# Patient Record
Sex: MALE | Race: WHITE | Employment: FULL TIME | URBAN - METROPOLITAN AREA
[De-identification: names, ages, dates, MRNs, and addresses within clinical notes are randomized per-mention and may not be internally consistent; named-entity substitution may affect disease eponyms.]

---

## 2020-11-10 PROBLEM — R42 DIZZINESS: Status: ACTIVE | Noted: 2020-11-10

## 2020-12-16 PROBLEM — I10 ESSENTIAL HYPERTENSION: Status: ACTIVE | Noted: 2020-12-16

## 2020-12-16 PROBLEM — G43.909 MIGRAINES: Status: ACTIVE | Noted: 2020-12-16

## 2024-01-31 ENCOUNTER — ANESTHESIA EVENT (OUTPATIENT)
Dept: SURGERY | Facility: HOSPITAL | Age: 54
End: 2024-01-31
Payer: MEDICAID

## 2024-01-31 ENCOUNTER — HOSPITAL ENCOUNTER (OUTPATIENT)
Facility: HOSPITAL | Age: 54
Setting detail: HOSPITAL OUTPATIENT SURGERY
Discharge: HOME | End: 2024-01-31
Attending: OTOLARYNGOLOGY | Admitting: OTOLARYNGOLOGY
Payer: MEDICAID

## 2024-01-31 ENCOUNTER — ANESTHESIA (OUTPATIENT)
Dept: SURGERY | Facility: HOSPITAL | Age: 54
End: 2024-01-31
Payer: MEDICAID

## 2024-01-31 VITALS
TEMPERATURE: 97 F | SYSTOLIC BLOOD PRESSURE: 149 MMHG | HEIGHT: 67 IN | RESPIRATION RATE: 18 BRPM | DIASTOLIC BLOOD PRESSURE: 98 MMHG | OXYGEN SATURATION: 96 % | HEART RATE: 89 BPM | BODY MASS INDEX: 28.72 KG/M2 | WEIGHT: 183 LBS

## 2024-01-31 DIAGNOSIS — K13.70 LESION OF ORAL MUCOSA: ICD-10-CM

## 2024-01-31 DIAGNOSIS — Z01.818 PRE-OP TESTING: Primary | ICD-10-CM

## 2024-01-31 PROCEDURE — 88307 TISSUE EXAM BY PATHOLOGIST: CPT | Performed by: OTOLARYNGOLOGY

## 2024-01-31 PROCEDURE — 0WB3XZZ EXCISION OF ORAL CAVITY AND THROAT, EXTERNAL APPROACH: ICD-10-PCS | Performed by: OTOLARYNGOLOGY

## 2024-01-31 RX ORDER — ONDANSETRON 2 MG/ML
4 INJECTION INTRAMUSCULAR; INTRAVENOUS EVERY 6 HOURS PRN
Status: DISCONTINUED | OUTPATIENT
Start: 2024-01-31 | End: 2024-01-31

## 2024-01-31 RX ORDER — GLYCOPYRROLATE 0.2 MG/ML
INJECTION, SOLUTION INTRAMUSCULAR; INTRAVENOUS AS NEEDED
Status: DISCONTINUED | OUTPATIENT
Start: 2024-01-31 | End: 2024-01-31 | Stop reason: SURG

## 2024-01-31 RX ORDER — HYDROMORPHONE HYDROCHLORIDE 1 MG/ML
INJECTION, SOLUTION INTRAMUSCULAR; INTRAVENOUS; SUBCUTANEOUS
Status: COMPLETED
Start: 2024-01-31 | End: 2024-01-31

## 2024-01-31 RX ORDER — PROCHLORPERAZINE EDISYLATE 5 MG/ML
5 INJECTION INTRAMUSCULAR; INTRAVENOUS EVERY 8 HOURS PRN
Status: DISCONTINUED | OUTPATIENT
Start: 2024-01-31 | End: 2024-01-31

## 2024-01-31 RX ORDER — HYDROMORPHONE HYDROCHLORIDE 1 MG/ML
0.4 INJECTION, SOLUTION INTRAMUSCULAR; INTRAVENOUS; SUBCUTANEOUS EVERY 5 MIN PRN
Status: DISCONTINUED | OUTPATIENT
Start: 2024-01-31 | End: 2024-01-31

## 2024-01-31 RX ORDER — HYDROCODONE BITARTRATE AND ACETAMINOPHEN 5; 325 MG/1; MG/1
1 TABLET ORAL ONCE AS NEEDED
Status: COMPLETED | OUTPATIENT
Start: 2024-01-31 | End: 2024-01-31

## 2024-01-31 RX ORDER — HYDROMORPHONE HYDROCHLORIDE 1 MG/ML
0.6 INJECTION, SOLUTION INTRAMUSCULAR; INTRAVENOUS; SUBCUTANEOUS EVERY 5 MIN PRN
Status: DISCONTINUED | OUTPATIENT
Start: 2024-01-31 | End: 2024-01-31

## 2024-01-31 RX ORDER — HYDROCODONE BITARTRATE AND ACETAMINOPHEN 5; 325 MG/1; MG/1
1-2 TABLET ORAL EVERY 6 HOURS PRN
Qty: 30 TABLET | Refills: 0 | Status: SHIPPED | OUTPATIENT
Start: 2024-01-31

## 2024-01-31 RX ORDER — MIDAZOLAM HYDROCHLORIDE 1 MG/ML
1 INJECTION INTRAMUSCULAR; INTRAVENOUS EVERY 5 MIN PRN
Status: DISCONTINUED | OUTPATIENT
Start: 2024-01-31 | End: 2024-01-31

## 2024-01-31 RX ORDER — CEFAZOLIN SODIUM/WATER 2 G/20 ML
2 SYRINGE (ML) INTRAVENOUS ONCE
Status: COMPLETED | OUTPATIENT
Start: 2024-01-31 | End: 2024-01-31

## 2024-01-31 RX ORDER — METOCLOPRAMIDE HYDROCHLORIDE 5 MG/ML
INJECTION INTRAMUSCULAR; INTRAVENOUS AS NEEDED
Status: DISCONTINUED | OUTPATIENT
Start: 2024-01-31 | End: 2024-01-31 | Stop reason: SURG

## 2024-01-31 RX ORDER — SCOLOPAMINE TRANSDERMAL SYSTEM 1 MG/1
1 PATCH, EXTENDED RELEASE TRANSDERMAL ONCE
Status: DISCONTINUED | OUTPATIENT
Start: 2024-01-31 | End: 2024-01-31 | Stop reason: HOSPADM

## 2024-01-31 RX ORDER — HYDROCODONE BITARTRATE AND ACETAMINOPHEN 5; 325 MG/1; MG/1
2 TABLET ORAL ONCE AS NEEDED
Status: COMPLETED | OUTPATIENT
Start: 2024-01-31 | End: 2024-01-31

## 2024-01-31 RX ORDER — LIDOCAINE HYDROCHLORIDE AND EPINEPHRINE 10; 10 MG/ML; UG/ML
INJECTION, SOLUTION INFILTRATION; PERINEURAL AS NEEDED
Status: DISCONTINUED | OUTPATIENT
Start: 2024-01-31 | End: 2024-01-31 | Stop reason: HOSPADM

## 2024-01-31 RX ORDER — SODIUM CHLORIDE, SODIUM LACTATE, POTASSIUM CHLORIDE, CALCIUM CHLORIDE 600; 310; 30; 20 MG/100ML; MG/100ML; MG/100ML; MG/100ML
INJECTION, SOLUTION INTRAVENOUS CONTINUOUS
Status: DISCONTINUED | OUTPATIENT
Start: 2024-01-31 | End: 2024-01-31

## 2024-01-31 RX ORDER — NALOXONE HYDROCHLORIDE 0.4 MG/ML
0.08 INJECTION, SOLUTION INTRAMUSCULAR; INTRAVENOUS; SUBCUTANEOUS AS NEEDED
Status: DISCONTINUED | OUTPATIENT
Start: 2024-01-31 | End: 2024-01-31

## 2024-01-31 RX ORDER — CHLORHEXIDINE GLUCONATE ORAL RINSE 1.2 MG/ML
SOLUTION DENTAL
Qty: 473 ML | Refills: 0 | Status: SHIPPED | OUTPATIENT
Start: 2024-01-31

## 2024-01-31 RX ORDER — ACETAMINOPHEN 500 MG
1000 TABLET ORAL ONCE AS NEEDED
Status: COMPLETED | OUTPATIENT
Start: 2024-01-31 | End: 2024-01-31

## 2024-01-31 RX ORDER — ACETAMINOPHEN 500 MG
1000 TABLET ORAL ONCE
Status: DISCONTINUED | OUTPATIENT
Start: 2024-01-31 | End: 2024-01-31 | Stop reason: HOSPADM

## 2024-01-31 RX ORDER — HYDROMORPHONE HYDROCHLORIDE 1 MG/ML
0.2 INJECTION, SOLUTION INTRAMUSCULAR; INTRAVENOUS; SUBCUTANEOUS EVERY 5 MIN PRN
Status: DISCONTINUED | OUTPATIENT
Start: 2024-01-31 | End: 2024-01-31

## 2024-01-31 RX ORDER — DEXAMETHASONE SODIUM PHOSPHATE 4 MG/ML
VIAL (ML) INJECTION AS NEEDED
Status: DISCONTINUED | OUTPATIENT
Start: 2024-01-31 | End: 2024-01-31 | Stop reason: SURG

## 2024-01-31 RX ORDER — MEPERIDINE HYDROCHLORIDE 25 MG/ML
25 INJECTION INTRAMUSCULAR; INTRAVENOUS; SUBCUTANEOUS
Status: DISCONTINUED | OUTPATIENT
Start: 2024-01-31 | End: 2024-01-31

## 2024-01-31 RX ORDER — ONDANSETRON 2 MG/ML
INJECTION INTRAMUSCULAR; INTRAVENOUS
Status: COMPLETED
Start: 2024-01-31 | End: 2024-01-31

## 2024-01-31 RX ADMIN — METOCLOPRAMIDE HYDROCHLORIDE 10 MG: 5 INJECTION INTRAMUSCULAR; INTRAVENOUS at 11:46:00

## 2024-01-31 RX ADMIN — GLYCOPYRROLATE 0.4 MG: 0.2 INJECTION, SOLUTION INTRAMUSCULAR; INTRAVENOUS at 11:00:00

## 2024-01-31 RX ADMIN — CEFAZOLIN SODIUM/WATER 2 G: 2 G/20 ML SYRINGE (ML) INTRAVENOUS at 10:30:00

## 2024-01-31 RX ADMIN — DEXAMETHASONE SODIUM PHOSPHATE 4 MG: 4 MG/ML VIAL (ML) INJECTION at 11:46:00

## 2024-01-31 NOTE — ANESTHESIA PROCEDURE NOTES
Airway  Date/Time: 1/31/2024 10:31 AM  Urgency: elective    Airway not difficult    General Information and Staff    Patient location during procedure: OR  Anesthesiologist: Hector Arguello MD  Performed: anesthesiologist   Performed by: Hector Arguello MD  Authorized by: Hector Arguello MD      Indications and Patient Condition  Indications for airway management: anesthesia  Spontaneous Ventilation: absent  Sedation level: deep  Preoxygenated: yes  Patient position: sniffing  Mask difficulty assessment: 1 - vent by mask    Final Airway Details  Final airway type: endotracheal airway      Successful airway: ETT  Cuffed: yes   Successful intubation technique: direct laryngoscopy  Endotracheal tube insertion site: oral  Blade: Scott  Blade size: #3  ETT size (mm): 7.0    Cormack-Lehane Classification: grade IIA - partial view of glottis  Placement verified by: capnometry   Measured from: teeth  ETT to teeth (cm): 22  Number of attempts at approach: 1  Ventilation between attempts: none

## 2024-01-31 NOTE — BRIEF OP NOTE
Pre-Operative Diagnosis: ORAL LEUKOPLAKIA     Post-Operative Diagnosis: ORAL LEUKOPLAKIA      Procedure Performed:   EXCISION LESION OF ORAL CAVITY LESION WITH REPAIR    Surgeon(s) and Role:     * Mario Marrufo MD - Primary    Assistant(s):   none     Surgical Findings: irregular mucosal lesion of right buccal surface     Specimen: right buccal surface lesion     Estimated Blood Loss: Blood Output: 5 mL (1/31/2024 11:43 AM)    Mario Marrufo MD  1/31/2024  12:00 PM

## 2024-01-31 NOTE — DISCHARGE INSTRUCTIONS
[address]   Phone [phone]  Mouth Surgery Instructions  -  Dr. Mario Marrufo      WHAT TO EXPECT AFTER SURGERY  Lip swelling and soreness of the mouth/lip are normal    ACTIVITY   No vigorous physical activity for 1 week after surgery (no running or playing hard at recess or PE class)    DIET  Soft foods and cool drinks are best.  Avoid acidic, spicy, crunchy, or sharp foods (especially nuts, popcorn, chips, pretzels and pizza crust)    MEDICATIONS  You may use Tylenol® (acetaminophen) or ibuprofen as needed for pain (dose according to weight and/or age, as indicated on the product packaging).  Rinse the mouth with prescription mouthwash (chlorhexidine) after meals and at bed time    FOLLOW-UP  Make an appointment to see your surgeon 1 week after surgery.

## 2024-01-31 NOTE — H&P
CHIEF COMPLAINT: Patient presents with:  Pre-Op Exam: When-1/31/24  Where- [facility]  Done by- Mario Marrufo MD  Procedure- Execision Lesion of oral cavity with repair    Samuel Miranda is a 53 year old male who presents for pre-op risk stratification.  Patient is planning to have Excision Lesion of Oral Cavithy with Repair with Dr. Marrufo at [facility] on 1/31/2024.  Patient needs labs/EKG/h&p and was advised to hold ASA/NSAIDs for 7 days.    Patient denies:  Chest pain or discomfort / Jaw pain or discomfort / Arm pain or discomfort / SOB / MONTGOMERY / Paroxysmal nocturnal dyspnea / Orthopnea / Palpations / Edema    Previous hx of cardiac or vascular issues include:  none    Patient has a 0 pack year history of smoking.    REVIEW OF SYSTEMS:  SYSTEMIC: No fatigue. No unusual changes in weight. No fever or chills.  HEAD: No headache.  EYES: No changes in vision.  ENT: Denies: Sore throat.  NECK: No pain with ROM.  CENTRAL NERVOUS SYSTEM: Denies: Weakness.  RESPIRATORY: Denies: Cough, sputum production, hemoptysis, wheezing or dyspnea.  CARDIOVASCULAR: No chest pain, no palpitations. Denies SOB  GASTROINTESTINAL: Denies N/V/D/C, Denies melena, hematochezia  GENITOURINARY: No dysuria, hematuria, frequency, urgency, or incontinence.  MUSCULOSKELETAL/JOINTS: No pain, swelling or stiffness. No decreased ROM  SKIN: No rashes or lesions.  HEME: Denies easy bruising or bleeding.  PSYCHIATRIC: Denies: depression, anxiety, changes in sleep patterns    PAST MEDICAL HISTORY:  Past Medical History:  Diagnosis Date  Essential hypertension    SOCIAL HISTORY:  Social History  Tobacco Use  Smoking status: Never  Smokeless tobacco: Never  Vaping Use  Vaping Use: Never used  Alcohol use: Not Currently  Alcohol/week: 1.0 standard drink of alcohol  Types: 1 Cans of beer per week  Comment: rare  Drug use: Never    MEDICATIONS:  Current Outpatient Medications  Medication Sig Dispense Refill  naproxen 500 MG Oral Tab Take 500  mg by mouth 2 (two) times daily with meals.  amLODIPine 5 MG Oral Tab Take 1 tablet (5 mg total) by mouth daily. 90 tablet 1  SUMAtriptan 25 MG Oral Tab Take 1 tablet (25 mg total) by mouth as needed for Migraine. 90 tablet 1    ALLERGIES:    Adhesive Tape ITCHING    PROBLEM LIST:  Patient Active Problem List:  Dizziness  Essential hypertension  Migraines  Paresthesias  Benign positional paroxysmal vertigo involving lateral canal  Vitamin B12 deficiency  Hashimoto's disease  SOB (shortness of breath) on exertion  Hyperlipidemia, unspecified hyperlipidemia type  Calculus of gallbladder with chronic cholecystitis without obstruction    PHYSICAL EXAM:  VITALS: /77 (BP Location: Left arm, Patient Position: Sitting, Cuff Size: adult)  Pulse 65  Temp 97.4 °F (36.3 °C) (Oral)  Resp 16  Ht 5' 7\" (1.702 m)  Wt 185 lb 3.2 oz (84 kg)  SpO2 100%  BMI 29.01 kg/m²  GENERAL: Alert &Oriented x 3 / No Apparent Distress  HEENT: Normocephalic Atraumatic / CARLY / EOMI / B/L ears unremarkable  ORAL CAVITY: Moist Mucus Membranes  PHARYNX: No Erythema or Exudates  LYMPH NODES: No significant Lymphadenopathy  SKIN: Warm and dry  NECK: Supple, non-tender. JVP not elevated. No carotid bruits. No goiter.  BACK: No kyphosis. No scoliosis.  LUNGS: Clear to Auscultation Bilaterally without Rhonchi, Wheezes, Rales  HEART: Regular Rate Rhythm / No murmur appreciated  ABDOMEN: Soft, Non-tender, Non-distended. Bowel sounds are normal. No masses or organomegaly. No guarding or rebound tenderness.  EXTREMITIES: No clubbing, cyanosis or edema / Peripheral pulses are intact.  NEUROLOGIC: Grossly Normal. No focal deficits.    ASSESSMENT/PLAN:    Samuel was seen today for pre-op exam.    Diagnoses and all orders for this visit:    Pre-op examination  - ELECTROCARDIOGRAM, COMPLETE  - BASIC METABOLIC PANEL; Future  - CBC WITH DIFFERENTIAL WITH PLATELET; Future  Oral lesion  - Patient is planning to have Excision Lesion of Oral Cavithy with  Repair with Dr. Marrufo at [facility] on 1/31/2024.  Patient needs labs/EKG/h&p and was advised to hold ASA/NSAIDs for 7 days.  - Pre-Op labs were reviewed by myself and demonstrated: ACCEPTABLE  - EKG was reviewed by myself and demonstrated: NSR w/o ST changes  - Patient should follow up with Dr. Marrufo for Post-Op management and with PCP as recommended    Surgical Risk Stratification was performed in office today.  PATIENT IS A(N) ACCEPTABLE RISK FOR PLANNED PROCEDURE.  MEDICAL CLEARANCE HAS BEEN GRANTED.    Essential hypertension  - Stable  - Continue present management  - Will monitor    Migraine without status migrainosus, not intractable, unspecified migraine type  - Stable  - Continue present management  - Will monitor    Hyperlipidemia, unspecified hyperlipidemia type  - LIPID PANEL; Future  - Stable  - Continue present management  - Will monitor    Hashimoto's disease  - TSH W REFLEX TO FREE T4; Future  - Stable  - Continue present management  - Will monitor    This consult was sent back to the referring physician, Dr. Marrufo.    Patient was seen and examined by myself. I reviewed the past medical, family, and social history sections including the medications and allergies listed in the above medical record.    All questions that came up during the course of this visit were answered to the patient's verbal satisfaction. Patient understands their follow up plan and was provided with an After Visit Summary upon discharge.    The patient is advised to call the office if the condition worsens or new symptoms develop. If worsening symptoms develop and the clinic is not open, the patient is advised to go to the nearest urgent care or emergency department for evaluation.    Tay Escobar DO  01/17/24  12:39 PM

## 2024-01-31 NOTE — ANESTHESIA PREPROCEDURE EVALUATION
PRE-OP EVALUATION    Patient Name: Samuel Miranda    Admit Diagnosis: ORAL LEUKOPLAKIA    Pre-op Diagnosis: ORAL LEUKOPLAKIA    EXCISION LESION OF ORAL CAVITY WITH REPAIR BILATERAL    Anesthesia Procedure: EXCISION LESION OF ORAL CAVITY WITH REPAIR BILATERAL (Mouth)    Surgeon(s) and Role:     * Mario Marrufo MD - Primary    Pre-op vitals reviewed.  Temp: 97.2 °F (36.2 °C)  Pulse: 79  Resp: 18  BP: 149/93  SpO2: 95 %  Body mass index is 28.66 kg/m².    Current medications reviewed.  Hospital Medications:   lactated ringers infusion   Intravenous Continuous    [COMPLETED] ceFAZolin (Ancef) 2 g in 20mL IV syringe premix  2 g Intravenous Once    lactated ringers infusion   Intravenous Continuous    lactated ringers IV bolus 500 mL  500 mL Intravenous Once PRN    atropine 0.1 MG/ML injection 0.5 mg  0.5 mg Intravenous PRN    naloxone (Narcan) 0.4 MG/ML injection 0.08 mg  0.08 mg Intravenous PRN    fentaNYL (Sublimaze) 50 mcg/mL injection 25 mcg  25 mcg Intravenous Q5 Min PRN    Or    fentaNYL (Sublimaze) 50 mcg/mL injection 50 mcg  50 mcg Intravenous Q5 Min PRN    HYDROmorphone (Dilaudid) 1 MG/ML injection 0.2 mg  0.2 mg Intravenous Q5 Min PRN    Or    HYDROmorphone (Dilaudid) 1 MG/ML injection 0.4 mg  0.4 mg Intravenous Q5 Min PRN    Or    HYDROmorphone (Dilaudid) 1 MG/ML injection 0.6 mg  0.6 mg Intravenous Q5 Min PRN    [COMPLETED] acetaminophen (Tylenol Extra Strength) tab 1,000 mg  1,000 mg Oral Once PRN    Or    [COMPLETED] HYDROcodone-acetaminophen (Norco) 5-325 MG per tab 1 tablet  1 tablet Oral Once PRN    Or    [COMPLETED] HYDROcodone-acetaminophen (Norco) 5-325 MG per tab 2 tablet  2 tablet Oral Once PRN    ondansetron (Zofran) 4 MG/2ML injection 4 mg  4 mg Intravenous Q6H PRN    prochlorperazine (Compazine) 10 MG/2ML injection 5 mg  5 mg Intravenous Q8H PRN    midazolam (Versed) 2 MG/2ML injection 1 mg  1 mg Intravenous Q5 Min PRN    meperidine (Demerol) 25 MG/ML injection 25 mg  25 mg Intravenous Q1H  PRN    [COMPLETED] HYDROmorphone (Dilaudid) 1 MG/ML injection        [COMPLETED] ondansetron (Zofran) 4 MG/2ML injection           Outpatient Medications:     Medications Prior to Admission   Medication Sig Dispense Refill Last Dose    amLODIPine 5 MG Oral Tab Take 1 tablet (5 mg total) by mouth daily. 90 tablet 1 1/31/2024 at 0410    SUMAtriptan 25 MG Oral Tab Take 1 tablet (25 mg total) by mouth as needed for Migraine. 90 tablet 0 Past Week    famotidine 20 MG Oral Tab Take 1 tablet (20 mg total) by mouth 2 (two) times daily. 90 tablet 1 Unknown       Allergies: Adhesive tape      Anesthesia Evaluation    Patient summary reviewed.    Anesthetic Complications  (-) history of anesthetic complications         GI/Hepatic/Renal    Negative GI/hepatic/renal ROS.                             Cardiovascular        Exercise tolerance: good           (+) hypertension                                     Endo/Other    Negative endo/other ROS.                              Pulmonary                    (+) sleep apnea and CPAP      Neuro/Psych    Negative neuro/psych ROS.                                  Past Surgical History:   Procedure Laterality Date    CHOLECYSTECTOMY      OTHER SURGICAL HISTORY      \"Small cyst removed from jawline\"     Social History     Socioeconomic History    Marital status:    Tobacco Use    Smoking status: Never    Smokeless tobacco: Never   Vaping Use    Vaping Use: Never used   Substance and Sexual Activity    Alcohol use: Not Currently     Alcohol/week: 1.0 standard drink of alcohol     Types: 1 Cans of beer per week     Comment: rare    Drug use: Never     History   Drug Use Unknown     Available pre-op labs reviewed.               Airway      Mallampati: II  Mouth opening: 3 FB  TM distance: 4 - 6 cm  Neck ROM: limited Cardiovascular    Cardiovascular exam normal.         Dental    Dentition appears grossly intact         Pulmonary    Pulmonary exam normal.                 Other findings               ASA: 2   Plan: general  NPO status verified and patient meets guidelines.    Post-procedure pain management plan discussed with surgeon and patient.      Plan/risks discussed with: patient and spouse                Present on Admission:   Lesion of oral mucosa

## 2024-01-31 NOTE — ANESTHESIA POSTPROCEDURE EVALUATION
[facility]:  [facility]   Age/Gender 54 year old male MRN [id number]   Location [facility] PERIOPERATIVE SERVICE Attending Mario Marrufo MD   Hosp Day # 0 PCP Tay Escobar DO       Anesthesia Post-op Note    EXCISION LESION OF ORAL CAVITY WITH REPAIR BILATERAL    Procedure Summary       Date: 01/31/24 Room / Location:  MAIN OR 10 / [facility] MAIN OR    Anesthesia Start: 1028 Anesthesia Stop: 1151    Procedure: EXCISION LESION OF ORAL CAVITY WITH REPAIR BILATERAL (Mouth) Diagnosis: (ORAL LEUKOPLAKIA)    Surgeons: Mario Marrufo MD Anesthesiologist: Hector Arguello MD    Anesthesia Type: general ASA Status: 2            Anesthesia Type: general    Vitals Value Taken Time   /97 01/31/24 1359   Temp 97.2 °F (36.2 °C) 01/31/24 1257   Pulse 88 01/31/24 1413   Resp 18 01/31/24 1345   SpO2 94 % 01/31/24 1413   Vitals shown include unfiled device data.    Patient Location: PACU    Anesthesia Type: general    Airway Patency: extubated    Postop Pain Control: adequate    Mental Status: preanesthetic baseline    Nausea/Vomiting: none    Cardiopulmonary/Hydration status: stable euvolemic    Complications: no apparent anesthesia related complications    Postop vital signs: stable    Dental Exam: Unchanged from Preop    Patient to be discharged home when criteria met.

## 2024-01-31 NOTE — INTERVAL H&P NOTE
Pre-op Diagnosis: ORAL LEUKOPLAKIA    The above referenced H&P was reviewed by Mario Marrufo MD on 1/31/2024, the patient was examined and no significant changes have occurred in the patient's condition since the H&P was performed.  I discussed with the patient and/or legal representative the potential benefits, risks and side effects of this procedure; the likelihood of the patient achieving goals; and potential problems that might occur during recuperation.  I discussed reasonable alternatives to the procedure, including risks, benefits and side effects related to the alternatives and risks related to not receiving this procedure.  We will proceed with procedure as planned.

## 2024-01-31 NOTE — OPERATIVE REPORT
PATIENT NAME: Samuel Miranda   YOB: 1970  MRN: [id number]   DATE OF OPERATION: 1/31/2024   LOCATION OF OPERATION: [facility]     PREOPERATIVE DIAGNOSIS:    1.  Oral cavity lesion of the right buccal surface    POSTOPERATIVE DIAGNOSIS:   1. Same     PROCEDURE:    1.  Excision of oral cavity lesion of right buccal surface (2 x 3 cm)    ANESTHESIA: General endotracheal anesthesia  SURGEON: Mario Marrufo MD, FACS  ASSISTANT: None  EBL: 5 mL  IVF: 1500 mL  UOP: None  DRAINS: None  IMPLANTS: None  SPECIMENS: Right buccal surface lesion  FINDINGS: Irregular approximately 2 x 3 cm mucosal lesion of the right buccal surface abutting the junction of wet and dry lip at the oral commissure    INDICATIONS & HISTORY:   Samuel Miranda is a 54 year old male with a history of a painless right buccal surface lesion.  The lesion failed to resolve with medical management.  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  Complete excision of the lesion was recommended.    DESCRIPTION OF PROCEDURE:   The patient was brought to the operating room, correctly identified, and the procedures verified as part of a full time out.  Following the induction of general anesthesia, he was endotracheally intubated without difficulty.  He was left in a supine position on the operating table which was placed in a slight reverse Trendelenburg position.  He was then prepped and draped in standard fashion for oral cavity surgery.  The oral cavity was inspected and the right buccal surface lesion appeared slightly stellate and irregular and was approximately 2 x 3 cm in maximal dimension the anteriormost aspect of which abutted the junction of the wet and dry lip at the right oral commissure.T the needle tip Bovie electrocautery was used to kamilla the periphery of the lesion at several locations.  A 2 to 3 mm margin around the lesion was then marked demarcated with the discussion of the  anteriormost aspect which abutted the dry lip.  The margin at this location was immediately at the termination of the lesion.  Submucosal injection was then performed with 1% lidocaine with 1 100,000 epinephrine.  The lesion was sharply circumscribed with a 15 blade scalpel and was removed sharply.  The specimen was marked with a short suture superiorly, a long suture inferiorly and a medium sized suture anteriorly.  The specimen was handed off the field.  The Bovie electrocautery was used to obtain hemostasis in the wound bed.  The mucosa surrounding the defect was undermined.  The defect was then closed in a multilayered fashion with 4-0 Vicryl for the deep layers and a combination of 3-0 and 4-0 chromic for the mucosa in both interrupted and running-locking fashion.  The defect closed well.  He was allowed to emerge from anesthesia, was extubated and was transported to the recovery area in stable condition.    COMPLICATIONS: None   DISPOSITION: Stable to recovery

## (undated) DEVICE — Device

## (undated) DEVICE — SUT ETHBND EXCEL SZ 3-0 L30IN

## (undated) DEVICE — E-Z CLEAN, NON-STICK, PTFE COATED, ELECTROSURGICAL NEEDLE ELECTRODE, MODIFIED EXTENDED INSULATION, 2.75 INCH (7 CM): Brand: MEGADYNE

## (undated) DEVICE — SUTURE PERMAHAND SZ 3-0 L30IN NONABSORBABLE .

## (undated) DEVICE — SLEEVE COMPR M KNEE LEN SGL USE KENDALL SCD

## (undated) DEVICE — HEAD AND NECK CDS-LF: Brand: MEDLINE INDUSTRIES, INC.

## (undated) DEVICE — V. MUELLER ADSON FORCEPS DELICATE 1X2 TEETH WITH SERRATIONS OVERALL LENGTH 4-3/4 INCHES 12.1CM: Brand: V. MUELLER

## (undated) DEVICE — LIGHT HANDLE

## (undated) DEVICE — SOLUTION IRRIG 1000ML 0.9% NACL USP BTL

## (undated) DEVICE — STERILE POLYISOPRENE POWDER-FREE SURGICAL GLOVES: Brand: PROTEXIS

## (undated) DEVICE — SUTURE CHROMIC GUT SZ 4-0 L27IN ABSRB UD .

## (undated) DEVICE — ST. TONGUE BLADES: Brand: DEROYAL

## (undated) NOTE — LETTER
OUTSIDE TESTING RESULT REQUEST     IMPORTANT: FOR YOUR IMMEDIATE ATTENTION  Please FAX all test results listed below to: [phone]     Testing already done on or about: 2024     * * * * If testing is NOT complete, arrange with patient A.S.A.P. * * * *      Patient Name: Samuel Miranda  Surgery Date: 2024  Medical Record: [id number]  CSN: [id number]  : 1970 - A: 54 y     Sex: male  Surgeon(s):  Mario Marrufo MD  Procedure: EXCISION LESION OF ORAL CAVITY WITH REPAIR BILATERAL  Anesthesia Type: MAC     Surgeon: Mario Marrufo MD     The following Testing and Time Line are REQUIRED PER ANESTHESIA     EKG READ AND SIGNED WITHIN   90 days  BMP (requires 4 hour fast) within  90 days      Thank You,   Sent by:Shirley HAMPTON

## (undated) NOTE — LETTER
FAX REGARDING HISTORY AND PHYSICALS  Patient Name: Samuel Miranda CSN: [id number]   MRN: [id number]  : 1970 - A: 54 y    Surgeon(s):  Mario Marrufo MD  Consent Procedure: EXCISION LESION OF ORAL CAVITY WITH REPAIR BILATERAL  Anesthesia Type: MAC    The patient listed above is coming in for a surgical/procedural case.  An H&P is needed within 30 days with an update note within 7 days of the surgery/procedure. It is the ordering physician’s responsibility to do the H&P or make arrangements with the PCP to have the H&P completed and faxed to Pre-Admission Testing at [phone].    Surgery Date: 2024    Acceptable History & Physicals are:     (a) Done within 30 days of the surgery/procedure, with an update note within 7 days of the surgery/procedure.                                                                                                               OR    (b) Done within 7 days of the surgery/procedure.      Thank you.